# Patient Record
Sex: MALE | Race: WHITE
[De-identification: names, ages, dates, MRNs, and addresses within clinical notes are randomized per-mention and may not be internally consistent; named-entity substitution may affect disease eponyms.]

---

## 2017-08-09 ENCOUNTER — HOSPITAL ENCOUNTER (EMERGENCY)
Dept: HOSPITAL 25 - UCEAST | Age: 67
Discharge: HOME | End: 2017-08-09
Payer: MEDICARE

## 2017-08-09 VITALS — SYSTOLIC BLOOD PRESSURE: 137 MMHG | DIASTOLIC BLOOD PRESSURE: 78 MMHG

## 2017-08-09 DIAGNOSIS — Z20.3: Primary | ICD-10-CM

## 2017-08-09 PROCEDURE — 96372 THER/PROPH/DIAG INJ SC/IM: CPT

## 2017-08-09 PROCEDURE — G0463 HOSPITAL OUTPT CLINIC VISIT: HCPCS

## 2017-08-09 PROCEDURE — 99211 OFF/OP EST MAY X REQ PHY/QHP: CPT

## 2017-08-09 PROCEDURE — 90471 IMMUNIZATION ADMIN: CPT

## 2017-08-09 PROCEDURE — 90375 RABIES IG IM/SC: CPT

## 2017-08-09 PROCEDURE — 90675 RABIES VACCINE IM: CPT

## 2017-08-09 NOTE — UC
Bite Injury/Animal HPI





- HPI Summary


HPI Summary: 





here for rabies prophylaxis


bat in house yesterday





- History of Current Complaint


Chief Complaint: UCBiteInjury


Stated Complaint: RABIES EXPOSURE


Time Seen by Provider: 08/09/17 11:42


Hx Obtained From: Patient





- Allergies/Home Medications


Allergies/Adverse Reactions: 


 Allergies











Allergy/AdvReac Type Severity Reaction Status Date / Time


 


No Known Allergies Allergy   Verified 12/12/16 17:53














PMH/Surg Hx/FS Hx/Imm Hx


Previously Healthy: Yes





- Surgical History


Surgical History: None





- Family History


Known Family History: Positive: Hypertension





- Social History


Alcohol Use: Weekly


Substance Use Type: None


Smoking Status (MU): Never Smoked Tobacco





- Immunization History


Most Recent Influenza Vaccination: doesn't get





Review of Systems


Constitutional: Negative


Skin: Negative


Eyes: Negative


ENT: Negative


Respiratory: Negative


Cardiovascular: Negative


Gastrointestinal: Negative


Genitourinary: Negative


Motor: Negative


Neurovascular: Negative


Musculoskeletal: Negative


Neurological: Negative


Psychological: Negative


All Other Systems Reviewed And Are Negative: Yes





Physical Exam


Triage Information Reviewed: Yes


Vital Signs: 


 Initial Vital Signs











Temp  98.2 F   08/09/17 11:32


 


Pulse  50   08/09/17 11:32


 


Resp  16   08/09/17 11:32


 


BP  137/78   08/09/17 11:32


 


Pulse Ox  97   08/09/17 11:32











Vital Signs Reviewed: Yes


Respiratory: Positive: Lungs clear, Normal breath sounds, No respiratory 

distress


Cardiovascular: Positive: RRR, No Murmur





Bite Injury Course/Dx





- Differential Dx/Diagnosis


Provider Diagnoses: rabies prophylaxis





Discharge





- Discharge Plan


Condition: Stable


Disposition: HOME


Patient Education Materials:  Rabies Immune Globulin (By injection), Rabies 

Vaccine (By injection)


Referrals: 


Kentrell Healy MD [Primary Care Provider] - 


Additional Instructions: 


Please review your discharge instructions. 


If your symptoms do not improve please call your primary care provider or 

return to urgent care.

## 2019-06-25 ENCOUNTER — HOSPITAL ENCOUNTER (EMERGENCY)
Dept: HOSPITAL 25 - UCEAST | Age: 69
Discharge: HOME | End: 2019-06-25
Payer: MEDICARE

## 2019-06-25 VITALS — DIASTOLIC BLOOD PRESSURE: 73 MMHG | SYSTOLIC BLOOD PRESSURE: 115 MMHG

## 2019-06-25 DIAGNOSIS — M70.21: Primary | ICD-10-CM

## 2019-06-25 PROCEDURE — G0463 HOSPITAL OUTPT CLINIC VISIT: HCPCS

## 2019-06-25 PROCEDURE — 99212 OFFICE O/P EST SF 10 MIN: CPT

## 2019-06-25 NOTE — UC
Elbow Pain





- HPI Summary


HPI Summary: 





right elbow pain / swelling x 5 days


pain is 4 out of 10 , worse by touch , better, with rest, and ice


no known injury , back of the elbow is red, swollen , painful , warm to touch 


was getting better , but worse since yesterday and today 


no fever, no chills 





- History of Current Complaint


Chief Complaint: UCSkin


Stated Complaint: RT ELBOW PAIN


Time Seen by Provider: 06/25/19 13:34


Hx Obtained From: Patient


Mechanism of Injury: no injury 


Onset/Duration: Days - 5, Atraumatic


Severity Initially: Mild


Severity Currently: Mild


Pain Intensity: 2


Location Of Pain: Is Discrete @ - right elbow


Character: Aching


Aggravating Factor(s): Movement


Alleviating Factor(s): Rest


Associated Signs And Symptoms: Positive: Swelling, Redness.  Negative: Bruising

, Fever, Weakness, Numbness/Tingling





- Allergies/Home Medications


Allergies/Adverse Reactions: 


 Allergies











Allergy/AdvReac Type Severity Reaction Status Date / Time


 


No Known Allergies Allergy   Verified 06/25/19 13:31














PMH/Surg Hx/FS Hx/Imm Hx


Previously Healthy: Yes





- Surgical History


Surgical History: None


Surgery Procedure, Year, and Place: DENIES





- Family History


Known Family History: Positive: Hypertension





- Social History


Alcohol Use: Weekly


Alcohol Amount: Three drinks per week


Substance Use Type: None


Smoking Status (MU): Never Smoked Tobacco





- Immunization History


Most Recent Influenza Vaccination: doesn't get





Review of Systems


All Other Systems Reviewed And Are Negative: Yes


Is Patient Immunocompromised?: No





Physical Exam


Triage Information Reviewed: Yes


Appearance: Well-Appearing, No Pain Distress, Well-Nourished


Vital Signs: 


 Initial Vital Signs











Temp  98.5 F   06/25/19 13:27


 


Pulse  59   06/25/19 13:27


 


Resp  18   06/25/19 13:27


 


BP  115/73   06/25/19 13:27


 


Pulse Ox  96   06/25/19 13:27











Vital Signs Reviewed: Yes


Eye Exam: Normal


Eyes: Positive: Conjunctiva Clear


ENT: Positive: Normal ENT inspection, Hearing grossly normal


Neck: Positive: Supple, Nontender, No Lymphadenopathy


Respiratory: Positive: Chest non-tender, Lungs clear, Normal breath sounds


Cardiovascular: Positive: RRR, No Murmur, Pulses Normal


Musculoskeletal: Positive: Other: - right elbow: + erythema, swellig, tender to 

touch , warm to touch, good ROM





Elbow Pain Course/Dx





- Differential Dx/Diagnosis


Provider Diagnosis: 


 Olecranon bursitis of right elbow








Discharge





- Sign-Out/Discharge


Documenting (check all that apply): Patient Departure


All imaging exams completed and their final reports reviewed: No Studies





- Discharge Plan


Condition: Stable


Disposition: HOME


Prescriptions: 


Cephalexin CAP* [Keflex CAP*] 500 mg PO TID #30 cap


Patient Education Materials:  Elbow Bursitis (ED)


Referrals: 


Lise De La Garza MD [Primary Care Provider] - 7 Days





- Billing Disposition and Condition


Condition: STABLE


Disposition: Home